# Patient Record
Sex: FEMALE | Race: WHITE | Employment: PART TIME | URBAN - METROPOLITAN AREA
[De-identification: names, ages, dates, MRNs, and addresses within clinical notes are randomized per-mention and may not be internally consistent; named-entity substitution may affect disease eponyms.]

---

## 2017-08-17 ENCOUNTER — PATIENT OUTREACH (OUTPATIENT)
Dept: FAMILY MEDICINE CLINIC | Facility: CLINIC | Age: 52
End: 2017-08-17

## 2017-08-17 NOTE — PROGRESS NOTES
Anupama Benavidez is due for mammogram.   Last mammogram date:  None in Epic  Mychart/Letter sent to patient.

## 2018-01-26 ENCOUNTER — PATIENT OUTREACH (OUTPATIENT)
Dept: FAMILY MEDICINE CLINIC | Facility: CLINIC | Age: 53
End: 2018-01-26

## 2019-02-04 ENCOUNTER — PATIENT OUTREACH (OUTPATIENT)
Dept: FAMILY MEDICINE CLINIC | Facility: CLINIC | Age: 54
End: 2019-02-04

## 2019-04-27 ENCOUNTER — OFFICE VISIT (OUTPATIENT)
Dept: FAMILY MEDICINE CLINIC | Facility: CLINIC | Age: 54
End: 2019-04-27
Payer: COMMERCIAL

## 2019-04-27 VITALS
TEMPERATURE: 99 F | BODY MASS INDEX: 27 KG/M2 | OXYGEN SATURATION: 98 % | WEIGHT: 166 LBS | SYSTOLIC BLOOD PRESSURE: 158 MMHG | HEART RATE: 114 BPM | DIASTOLIC BLOOD PRESSURE: 92 MMHG

## 2019-04-27 DIAGNOSIS — R09.81 NASAL CONGESTION: ICD-10-CM

## 2019-04-27 DIAGNOSIS — R05.8 PRODUCTIVE COUGH: Primary | ICD-10-CM

## 2019-04-27 DIAGNOSIS — R50.9 FEVER, UNSPECIFIED FEVER CAUSE: ICD-10-CM

## 2019-04-27 DIAGNOSIS — R03.0 ELEVATED BLOOD PRESSURE READING: ICD-10-CM

## 2019-04-27 PROCEDURE — 99203 OFFICE O/P NEW LOW 30 MIN: CPT | Performed by: FAMILY MEDICINE

## 2019-04-27 RX ORDER — AZITHROMYCIN 250 MG/1
TABLET, FILM COATED ORAL
Qty: 6 TABLET | Refills: 0 | Status: SHIPPED | OUTPATIENT
Start: 2019-04-27 | End: 2019-05-02

## 2019-04-27 NOTE — PROGRESS NOTES
Kerry  is a 47year old female. CC:  Patient presents with:  Sinus Problem: per pt      HPI:  The patient has primary complaint of nasal congestion and productive cough for  1 week. Associated symptoms include fever. The patient has 100.5-101. negative  PULM: faint coarseness in the L mid lung, no accessory muscle use  GI: not examined  PSYCH: alert and oriented x 3; affect appropriate  SKIN: not examined  BREAST: not examined/not applicable  EXTREMITIES: No clubbing, cyanosis or edema  RECTAL:

## 2019-05-01 ENCOUNTER — HOSPITAL ENCOUNTER (OUTPATIENT)
Dept: GENERAL RADIOLOGY | Age: 54
Discharge: HOME | End: 2019-05-01
Attending: FAMILY MEDICINE
Payer: COMMERCIAL

## 2019-05-01 DIAGNOSIS — R05.9 COUGH: ICD-10-CM

## 2019-05-01 DIAGNOSIS — R50.9 FEVER, UNSPECIFIED FEVER CAUSE: ICD-10-CM

## 2019-05-01 PROCEDURE — 71046 X-RAY EXAM CHEST 2 VIEWS: CPT | Performed by: FAMILY MEDICINE

## 2019-05-02 ENCOUNTER — TELEPHONE (OUTPATIENT)
Dept: FAMILY MEDICINE CLINIC | Facility: CLINIC | Age: 54
End: 2019-05-02

## 2019-05-02 NOTE — TELEPHONE ENCOUNTER
Pt called, she was wondering if we had the results of the chest xray she had done yesterday? If so, can we please call her with them?   Please call pt at [phone]

## 2019-05-02 NOTE — TELEPHONE ENCOUNTER
I told her at the [address] that it didn't show anything but I could hear something still in the lung base, she was going to call Friday AM and give me an update, has something changed?

## 2019-05-02 NOTE — TELEPHONE ENCOUNTER
Pt was advised of below information verbalized understanding    Pt states in the morning she has a lot of phlegm- she is trying to get as much out as possible. Pt denies fever.   Pt will keep us updated over the next couple days

## 2019-05-03 ENCOUNTER — TELEPHONE (OUTPATIENT)
Dept: FAMILY MEDICINE CLINIC | Facility: CLINIC | Age: 54
End: 2019-05-03

## 2019-05-03 NOTE — TELEPHONE ENCOUNTER
Patient is feeling better today and is coughing with some phlegm in the morning. But is doing better today.

## 2019-05-10 ENCOUNTER — TELEPHONE (OUTPATIENT)
Dept: FAMILY MEDICINE CLINIC | Facility: CLINIC | Age: 54
End: 2019-05-10

## 2019-05-10 RX ORDER — LEVOFLOXACIN 500 MG/1
500 TABLET, FILM COATED ORAL DAILY
Qty: 10 TABLET | Refills: 0 | Status: SHIPPED | OUTPATIENT
Start: 2019-05-10 | End: 2019-05-20

## 2019-05-10 NOTE — TELEPHONE ENCOUNTER
Patient notified and verbalized understanding. Requests script to [facility]  Pended script sent to pharmacy.

## 2019-05-10 NOTE — TELEPHONE ENCOUNTER
Zpac is gone and pt is coughing up lots of colored mucus. Can something else be sent to [facility]?  (Pt has allergies to some meds)

## 2019-05-10 NOTE — TELEPHONE ENCOUNTER
Patient states she is feeling better but is still coughing up a lot of heavy yellow phlem. States color used to be brown. States no fever. Is taking mucinex but is not getting any better. Doc Chow was given 4/27/19 at [address] with Dr Abdirashid Mccann.   It will be 2 wee

## 2020-01-22 ENCOUNTER — NURSE ONLY (OUTPATIENT)
Dept: FAMILY MEDICINE CLINIC | Facility: CLINIC | Age: 55
End: 2020-01-22
Payer: COMMERCIAL

## 2020-01-22 ENCOUNTER — TELEPHONE (OUTPATIENT)
Dept: FAMILY MEDICINE CLINIC | Facility: CLINIC | Age: 55
End: 2020-01-22

## 2020-01-22 DIAGNOSIS — R30.0 DYSURIA: Primary | ICD-10-CM

## 2020-01-22 LAB
MULTISTIX LOT#: NORMAL NUMERIC
PH, URINE: 6 (ref 4.5–8)
PROTEIN (URINE DIPSTICK): NEGATIVE MG/DL
SPECIFIC GRAVITY: <1.005 (ref 1–1.03)
UROBILINOGEN,SEMI-QN: 0.2 MG/DL (ref 0–1.9)

## 2020-01-22 PROCEDURE — 81003 URINALYSIS AUTO W/O SCOPE: CPT | Performed by: FAMILY MEDICINE

## 2020-01-22 PROCEDURE — 87077 CULTURE AEROBIC IDENTIFY: CPT | Performed by: FAMILY MEDICINE

## 2020-01-22 PROCEDURE — 87186 SC STD MICRODIL/AGAR DIL: CPT | Performed by: FAMILY MEDICINE

## 2020-01-22 PROCEDURE — 87086 URINE CULTURE/COLONY COUNT: CPT | Performed by: FAMILY MEDICINE

## 2020-01-22 RX ORDER — SULFAMETHOXAZOLE AND TRIMETHOPRIM 800; 160 MG/1; MG/1
1 TABLET ORAL 2 TIMES DAILY
Qty: 10 TABLET | Refills: 0 | Status: SHIPPED | OUTPATIENT
Start: 2020-01-22 | End: 2020-01-27

## 2020-01-22 NOTE — TELEPHONE ENCOUNTER
----- Message from Moiz Valencia DO sent at 1/22/2020 10:16 AM CST -----  Looks like she has a UTI, lets send it for culture, will start some bactrim DS for 5 days,

## 2020-01-22 NOTE — PROGRESS NOTES
Pt was in office for NV for urine labs - pt is having dysuria X1 day    Pt provided specimen- was sent home in stable condition.     Results sent to provider

## 2020-01-22 NOTE — TELEPHONE ENCOUNTER
Pt called, thinks she has a UTI. Can she just bring in a urine sample or does Dr Delfina Silva need to see her?   Please call pt at [phone]

## 2020-01-22 NOTE — TELEPHONE ENCOUNTER
Per verbal by Dr. Chris Guaman can do NV for urine dip    Pt advised- verbalized understanding    Future Appointments   Date Time Provider Pushpa Patel   1/22/2020 10:00 AM  [facility] [address] [facility] Celine Walls

## 2020-05-26 ENCOUNTER — OFFICE VISIT (OUTPATIENT)
Dept: FAMILY MEDICINE CLINIC | Facility: CLINIC | Age: 55
End: 2020-05-26
Payer: COMMERCIAL

## 2020-05-26 VITALS
HEIGHT: 66 IN | SYSTOLIC BLOOD PRESSURE: 130 MMHG | BODY MASS INDEX: 26.03 KG/M2 | DIASTOLIC BLOOD PRESSURE: 86 MMHG | TEMPERATURE: 99 F | HEART RATE: 120 BPM | WEIGHT: 162 LBS

## 2020-05-26 DIAGNOSIS — H69.81 ETD (EUSTACHIAN TUBE DYSFUNCTION), RIGHT: ICD-10-CM

## 2020-05-26 DIAGNOSIS — H92.02 OTALGIA, LEFT: ICD-10-CM

## 2020-05-26 DIAGNOSIS — R42 VERTIGO: Primary | ICD-10-CM

## 2020-05-26 PROCEDURE — 99213 OFFICE O/P EST LOW 20 MIN: CPT | Performed by: FAMILY MEDICINE

## 2020-05-26 NOTE — PROGRESS NOTES
HPI:   Janie Le is a 49year old female who presents for upper respiratory symptoms for  3  weeks. Patient reports sore throat, congestion, ear pain. Had pressure in her right ear and tried to pop because she couldn;t hear out of it.  And could not ge bruits  LUNGS: clear to auscultation  CARDIO: RRR without murmur  GI: good BS's,no masses, HSM or tenderness    ASSESSMENT AND PLAN:     Vertigo  (primary encounter diagnosis)  Otalgia, left  Etd (eustachian tube dysfunction), right    perform valsalva 10x

## 2020-05-29 ENCOUNTER — TELEPHONE (OUTPATIENT)
Dept: FAMILY MEDICINE CLINIC | Facility: CLINIC | Age: 55
End: 2020-05-29

## 2020-05-29 RX ORDER — METHYLPREDNISOLONE 4 MG/1
TABLET ORAL
Qty: 1 KIT | Refills: 0 | Status: SHIPPED | OUTPATIENT
Start: 2020-05-29 | End: 2020-09-22 | Stop reason: ALTCHOICE

## 2020-05-29 NOTE — TELEPHONE ENCOUNTER
Pt called she is still having some vertigo issues but she functioning and able to do her house work. Yesterday she felt better than today. She is also unable to pop right ear but left she able to pop. She doing everything that is recommended.

## 2020-05-29 NOTE — TELEPHONE ENCOUNTER
PT CALLED AND WANTED TO KNOW IF SHE SHOULD STILL CONTINUE TO TAKE FLONASE AND SUDAFED WHILE TAKING STEROID?     PLEASE CALL AND ADV    THANK YOU

## 2020-07-01 ENCOUNTER — OFFICE VISIT (OUTPATIENT)
Dept: FAMILY MEDICINE CLINIC | Facility: CLINIC | Age: 55
End: 2020-07-01
Payer: COMMERCIAL

## 2020-07-01 VITALS
SYSTOLIC BLOOD PRESSURE: 132 MMHG | RESPIRATION RATE: 16 BRPM | WEIGHT: 164.19 LBS | OXYGEN SATURATION: 99 % | TEMPERATURE: 100 F | BODY MASS INDEX: 27 KG/M2 | DIASTOLIC BLOOD PRESSURE: 86 MMHG | HEART RATE: 104 BPM

## 2020-07-01 DIAGNOSIS — R11.0 NAUSEA: ICD-10-CM

## 2020-07-01 DIAGNOSIS — J30.89 SEASONAL ALLERGIC RHINITIS DUE TO OTHER ALLERGIC TRIGGER: ICD-10-CM

## 2020-07-01 DIAGNOSIS — R42 VERTIGO: Primary | ICD-10-CM

## 2020-07-01 PROCEDURE — 99214 OFFICE O/P EST MOD 30 MIN: CPT | Performed by: FAMILY MEDICINE

## 2020-07-01 RX ORDER — MECLIZINE HYDROCHLORIDE 25 MG/1
25 TABLET ORAL 2 TIMES DAILY
Qty: 14 TABLET | Refills: 0 | Status: SHIPPED | OUTPATIENT
Start: 2020-07-01 | End: 2020-07-08

## 2020-07-01 NOTE — PROGRESS NOTES
HPI:   Kerry Machado is a 49year old female who presents for follow up after an upper respiratory symptoms for  3  weeks. Patient reported had, she was placed  pressure in her right ear and tried to pop because she couldn;t hear out of it.  And could not 132/86   Pulse 104   Temp 99.6 °F (37.6 °C) (Temporal)   Resp 16   Wt 164 lb 3.2 oz (74.5 kg)   LMP 06/15/2020   SpO2 99%   BMI 26.50 kg/m²   GENERAL: well developed, well nourished,in no apparent distress  SKIN: no rashes,no suspicious lesions  EYES:PERRL

## 2020-09-12 ENCOUNTER — TELEPHONE (OUTPATIENT)
Dept: FAMILY MEDICINE CLINIC | Facility: CLINIC | Age: 55
End: 2020-09-12

## 2020-09-12 DIAGNOSIS — N30.01 ACUTE CYSTITIS WITH HEMATURIA: Primary | ICD-10-CM

## 2020-09-12 RX ORDER — SULFAMETHOXAZOLE AND TRIMETHOPRIM 800; 160 MG/1; MG/1
1 TABLET ORAL 2 TIMES DAILY
Qty: 10 TABLET | Refills: 0 | Status: SHIPPED | OUTPATIENT
Start: 2020-09-12 | End: 2020-09-17

## 2020-09-12 NOTE — TELEPHONE ENCOUNTER
ON call    Patient reports increase frequency, urgency burning and low back pain. She ahs uti in past. [place] ok for charge to insurance for telephone encounter. Time spent 6 mins. .  Antibiotic send. F/u with pcp. If worsen go to ER.

## 2020-09-22 PROCEDURE — 88305 TISSUE EXAM BY PATHOLOGIST: CPT | Performed by: OBSTETRICS & GYNECOLOGY

## 2021-04-06 ENCOUNTER — TELEPHONE (OUTPATIENT)
Dept: FAMILY MEDICINE CLINIC | Facility: CLINIC | Age: 56
End: 2021-04-06

## 2022-06-07 ENCOUNTER — LABORATORY ENCOUNTER (OUTPATIENT)
Dept: LAB | Age: 57
End: 2022-06-07
Attending: FAMILY MEDICINE
Payer: COMMERCIAL

## 2022-06-07 ENCOUNTER — TELEPHONE (OUTPATIENT)
Dept: FAMILY MEDICINE CLINIC | Facility: CLINIC | Age: 57
End: 2022-06-07

## 2022-06-07 DIAGNOSIS — Z00.00 ROUTINE HEALTH MAINTENANCE: Primary | ICD-10-CM

## 2022-06-07 DIAGNOSIS — D50.0 IRON DEFICIENCY ANEMIA DUE TO CHRONIC BLOOD LOSS: Primary | ICD-10-CM

## 2022-06-07 DIAGNOSIS — Z00.00 ROUTINE HEALTH MAINTENANCE: ICD-10-CM

## 2022-06-07 DIAGNOSIS — D50.0 IRON DEFICIENCY ANEMIA DUE TO CHRONIC BLOOD LOSS: ICD-10-CM

## 2022-06-07 LAB
ALBUMIN SERPL-MCNC: 3.9 G/DL (ref 3.4–5)
ALBUMIN/GLOB SERPL: 1.1 {RATIO} (ref 1–2)
ALP LIVER SERPL-CCNC: 60 U/L
ALT SERPL-CCNC: 21 U/L
ANION GAP SERPL CALC-SCNC: 4 MMOL/L (ref 0–18)
AST SERPL-CCNC: 20 U/L (ref 15–37)
BASOPHILS # BLD AUTO: 0.06 X10(3) UL (ref 0–0.2)
BASOPHILS NFR BLD AUTO: 1.3 %
BILIRUB SERPL-MCNC: 0.7 MG/DL (ref 0.1–2)
BUN BLD-MCNC: 14 MG/DL (ref 7–18)
CALCIUM BLD-MCNC: 9 MG/DL (ref 8.5–10.1)
CHLORIDE SERPL-SCNC: 105 MMOL/L (ref 98–112)
CHOLEST SERPL-MCNC: 172 MG/DL (ref ?–200)
CO2 SERPL-SCNC: 28 MMOL/L (ref 21–32)
CREAT BLD-MCNC: 0.94 MG/DL
DEPRECATED HBV CORE AB SER IA-ACNC: 5.8 NG/ML
EOSINOPHIL # BLD AUTO: 0.32 X10(3) UL (ref 0–0.7)
EOSINOPHIL NFR BLD AUTO: 7 %
ERYTHROCYTE [DISTWIDTH] IN BLOOD BY AUTOMATED COUNT: 14.6 %
FASTING PATIENT LIPID ANSWER: YES
FASTING STATUS PATIENT QL REPORTED: YES
GLOBULIN PLAS-MCNC: 3.4 G/DL (ref 2.8–4.4)
GLUCOSE BLD-MCNC: 86 MG/DL (ref 70–99)
HCT VFR BLD AUTO: 37.2 %
HDLC SERPL-MCNC: 70 MG/DL (ref 40–59)
HGB BLD-MCNC: 11.8 G/DL
IMM GRANULOCYTES # BLD AUTO: 0.01 X10(3) UL (ref 0–1)
IMM GRANULOCYTES NFR BLD: 0.2 %
IRON SERPL-MCNC: 64 UG/DL
LDLC SERPL CALC-MCNC: 92 MG/DL (ref ?–100)
LYMPHOCYTES # BLD AUTO: 1.27 X10(3) UL (ref 1–4)
LYMPHOCYTES NFR BLD AUTO: 27.9 %
MCH RBC QN AUTO: 27.3 PG (ref 26–34)
MCHC RBC AUTO-ENTMCNC: 31.7 G/DL (ref 31–37)
MCV RBC AUTO: 86.1 FL
MONOCYTES # BLD AUTO: 0.4 X10(3) UL (ref 0.1–1)
MONOCYTES NFR BLD AUTO: 8.8 %
NEUTROPHILS # BLD AUTO: 2.49 X10 (3) UL (ref 1.5–7.7)
NEUTROPHILS # BLD AUTO: 2.49 X10(3) UL (ref 1.5–7.7)
NEUTROPHILS NFR BLD AUTO: 54.8 %
NONHDLC SERPL-MCNC: 102 MG/DL (ref ?–130)
OSMOLALITY SERPL CALC.SUM OF ELEC: 284 MOSM/KG (ref 275–295)
PLATELET # BLD AUTO: 217 10(3)UL (ref 150–450)
POTASSIUM SERPL-SCNC: 4 MMOL/L (ref 3.5–5.1)
PROT SERPL-MCNC: 7.3 G/DL (ref 6.4–8.2)
RBC # BLD AUTO: 4.32 X10(6)UL
SODIUM SERPL-SCNC: 137 MMOL/L (ref 136–145)
TRIGL SERPL-MCNC: 51 MG/DL (ref 30–149)
TSI SER-ACNC: 0.97 MIU/ML (ref 0.36–3.74)
VLDLC SERPL CALC-MCNC: 8 MG/DL (ref 0–30)
WBC # BLD AUTO: 4.6 X10(3) UL (ref 4–11)

## 2022-06-07 PROCEDURE — 80050 GENERAL HEALTH PANEL: CPT | Performed by: FAMILY MEDICINE

## 2022-06-07 PROCEDURE — 82728 ASSAY OF FERRITIN: CPT | Performed by: FAMILY MEDICINE

## 2022-06-07 PROCEDURE — 80061 LIPID PANEL: CPT | Performed by: FAMILY MEDICINE

## 2022-06-07 PROCEDURE — 83540 ASSAY OF IRON: CPT | Performed by: FAMILY MEDICINE

## 2022-06-07 NOTE — TELEPHONE ENCOUNTER
Labs are placed. Yvette Contreras can make a lab appointment. I have agreed to see her as a patient.

## 2022-06-07 NOTE — TELEPHONE ENCOUNTER
Patient advised and lab appt scheduled for today.   Future Appointments   Date Time Provider Pushpa Patel   6/7/2022  1:00 PM REF [facility] REF [facility] Ref Lab [facility]

## 2022-06-08 DIAGNOSIS — L29.9 ITCHY SKIN: Primary | ICD-10-CM

## 2022-06-09 NOTE — PROGRESS NOTES
Libertad Narayan you will need to return to get the allergy labs done. They can not be added to the blood that was drawn. Orders placed.

## 2022-06-13 ENCOUNTER — LABORATORY ENCOUNTER (OUTPATIENT)
Dept: LAB | Age: 57
End: 2022-06-13
Attending: FAMILY MEDICINE
Payer: COMMERCIAL

## 2022-06-13 DIAGNOSIS — L29.9 ITCHY SKIN: ICD-10-CM

## 2022-06-13 PROCEDURE — 82785 ASSAY OF IGE: CPT | Performed by: FAMILY MEDICINE

## 2022-06-13 PROCEDURE — 86003 ALLG SPEC IGE CRUDE XTRC EA: CPT | Performed by: FAMILY MEDICINE

## 2022-06-16 LAB
A ALTERNATA IGE QN: <0.1 KUA/L (ref ?–0.1)
A FUMIGATUS IGE QN: <0.1 KUA/L (ref ?–0.1)
AMER SYCAMORE IGE QN: <0.1 KUA/L (ref ?–0.1)
BERMUDA GRASS IGE QN: <0.1 KUA/L (ref ?–0.1)
BOXELDER IGE QN: <0.1 KUA/L (ref ?–0.1)
C HERBARUM IGE QN: <0.1 KUA/L (ref ?–0.1)
CALIF WALNUT IGE QN: <0.1 KUA/L (ref ?–0.1)
CAT DANDER IGE QN: <0.1 KUA/L (ref ?–0.1)
CLAM IGE QN: <0.1 KUA/L (ref ?–0.1)
CMN PIGWEED IGE QN: <0.1 KUA/L (ref ?–0.1)
CODFISH IGE QN: <0.1 KUA/L (ref ?–0.1)
COMMON RAGWEED IGE QN: <0.1 KUA/L (ref ?–0.1)
CORN IGE QN: <0.1 KUA/L (ref ?–0.1)
COTTONWOOD IGE QN: <0.1 KUA/L (ref ?–0.1)
COW MILK IGE QN: <0.1 KUA/L (ref ?–0.1)
D FARINAE IGE QN: <0.1 KUA/L (ref ?–0.1)
D PTERONYSS IGE QN: <0.1 KUA/L (ref ?–0.1)
DOG DANDER IGE QN: <0.1 KUA/L (ref ?–0.1)
EGG WHITE IGE QN: <0.1 KUA/L (ref ?–0.1)
IGE SERPL-ACNC: 7.59 KU/L (ref 2–214)
IGE SERPL-ACNC: 8.8 KU/L (ref 2–214)
M RACEMOSUS IGE QN: <0.1 KUA/L (ref ?–0.1)
MARSH ELDER IGE QN: <0.1 KUA/L (ref ?–0.1)
MOUSE EPITH IGE QN: <0.1 KUA/L (ref ?–0.1)
MT JUNIPER IGE QN: <0.1 KUA/L (ref ?–0.1)
P NOTATUM IGE QN: <0.1 KUA/L (ref ?–0.1)
PEANUT IGE QN: <0.1 KUA/L (ref ?–0.1)
PECAN/HICK TREE IGE QN: <0.1 KUA/L (ref ?–0.1)
ROACH IGE QN: <0.1 KUA/L (ref ?–0.1)
SALTWORT IGE QN: <0.1 KUA/L (ref ?–0.1)
SCALLOP IGE QN: <0.1 KUA/L (ref ?–0.1)
SESAME SEED IGE QN: <0.1 KUA/L (ref ?–0.1)
SHRIMP IGE QN: <0.1 KUA/L (ref ?–0.1)
SOYBEAN IGE QN: <0.1 KUA/L (ref ?–0.1)
TIMOTHY IGE QN: <0.1 KUA/L (ref ?–0.1)
WALNUT IGE QN: <0.1 KUA/L (ref ?–0.1)
WHEAT IGE QN: <0.1 KUA/L (ref ?–0.1)
WHITE ASH IGE QN: <0.1 KUA/L (ref ?–0.1)
WHITE ELM IGE QN: <0.1 KUA/L (ref ?–0.1)
WHITE MULBERRY IGE QN: <0.1 KUA/L (ref ?–0.1)
WHITE OAK IGE QN: <0.1 KUA/L (ref ?–0.1)

## 2022-07-08 ENCOUNTER — OFFICE VISIT (OUTPATIENT)
Dept: FAMILY MEDICINE CLINIC | Facility: CLINIC | Age: 57
End: 2022-07-08
Payer: COMMERCIAL

## 2022-07-08 VITALS
SYSTOLIC BLOOD PRESSURE: 120 MMHG | RESPIRATION RATE: 18 BRPM | TEMPERATURE: 98 F | DIASTOLIC BLOOD PRESSURE: 60 MMHG | WEIGHT: 164 LBS | OXYGEN SATURATION: 100 % | HEART RATE: 95 BPM | HEIGHT: 66 IN | BODY MASS INDEX: 26.36 KG/M2

## 2022-07-08 DIAGNOSIS — L29.8 CHRONIC PRURITIC RASH IN ADULT: Primary | ICD-10-CM

## 2022-07-08 DIAGNOSIS — D50.0 IRON DEFICIENCY ANEMIA DUE TO CHRONIC BLOOD LOSS: ICD-10-CM

## 2022-07-08 DIAGNOSIS — L50.3 DERMATOGRAPHIC URTICARIA: ICD-10-CM

## 2022-07-08 PROCEDURE — 3078F DIAST BP <80 MM HG: CPT | Performed by: FAMILY MEDICINE

## 2022-07-08 PROCEDURE — 99213 OFFICE O/P EST LOW 20 MIN: CPT | Performed by: FAMILY MEDICINE

## 2022-07-08 PROCEDURE — 3074F SYST BP LT 130 MM HG: CPT | Performed by: FAMILY MEDICINE

## 2022-07-08 PROCEDURE — 3008F BODY MASS INDEX DOCD: CPT | Performed by: FAMILY MEDICINE

## 2022-07-08 RX ORDER — FEXOFENADINE HYDROCHLORIDE 60 MG/1
60 TABLET, FILM COATED ORAL DAILY
COMMUNITY

## 2022-07-08 RX ORDER — PREDNISONE 20 MG/1
20 TABLET ORAL 2 TIMES DAILY
Qty: 10 TABLET | Refills: 0 | Status: SHIPPED | OUTPATIENT
Start: 2022-07-08 | End: 2022-07-13

## 2022-07-08 RX ORDER — HYDROCORTISONE 25 MG/ML
1 LOTION TOPICAL 2 TIMES DAILY
Qty: 59 ML | Refills: 0 | Status: SHIPPED | OUTPATIENT
Start: 2022-07-08

## 2022-07-12 ENCOUNTER — MED REC SCAN ONLY (OUTPATIENT)
Dept: FAMILY MEDICINE CLINIC | Facility: CLINIC | Age: 57
End: 2022-07-12

## 2022-08-23 ENCOUNTER — TELEPHONE (OUTPATIENT)
Dept: FAMILY MEDICINE CLINIC | Facility: CLINIC | Age: 57
End: 2022-08-23

## 2023-02-22 ENCOUNTER — TELEPHONE (OUTPATIENT)
Dept: FAMILY MEDICINE CLINIC | Facility: CLINIC | Age: 58
End: 2023-02-22

## 2023-02-22 DIAGNOSIS — D50.0 IRON DEFICIENCY ANEMIA DUE TO CHRONIC BLOOD LOSS: Primary | ICD-10-CM

## 2023-02-22 NOTE — TELEPHONE ENCOUNTER
LOV with Dr. Tatiana Jacome 7/8/22, was due for 3 month MD f/u with CBC and ferritin. Pt called and scheduled lab appt for next week for CBC, ferritin.      ANU DS, orders placed, after pt has labs, will have pt f/u with MD.

## 2023-02-22 NOTE — TELEPHONE ENCOUNTER
PT. CALLED AND SCHEDULED LAB APPT. FOR CBC AND FERRITAN FOR NEXT TUES. NEED ORDERS PLACED AND PLEASE CALL PT. IF EITHER OF THESE LABS ARE FASTING.

## 2023-02-28 ENCOUNTER — LABORATORY ENCOUNTER (OUTPATIENT)
Dept: LAB | Age: 58
End: 2023-02-28
Attending: FAMILY MEDICINE
Payer: COMMERCIAL

## 2023-02-28 DIAGNOSIS — D50.0 IRON DEFICIENCY ANEMIA DUE TO CHRONIC BLOOD LOSS: ICD-10-CM

## 2023-02-28 LAB
BASOPHILS # BLD AUTO: 0.05 X10(3) UL (ref 0–0.2)
BASOPHILS NFR BLD AUTO: 1 %
DEPRECATED HBV CORE AB SER IA-ACNC: 44.2 NG/ML
EOSINOPHIL # BLD AUTO: 0.25 X10(3) UL (ref 0–0.7)
EOSINOPHIL NFR BLD AUTO: 5.2 %
ERYTHROCYTE [DISTWIDTH] IN BLOOD BY AUTOMATED COUNT: 12.4 %
HCT VFR BLD AUTO: 44.8 %
HGB BLD-MCNC: 14.7 G/DL
IMM GRANULOCYTES # BLD AUTO: 0.02 X10(3) UL (ref 0–1)
IMM GRANULOCYTES NFR BLD: 0.4 %
LYMPHOCYTES # BLD AUTO: 1.15 X10(3) UL (ref 1–4)
LYMPHOCYTES NFR BLD AUTO: 24 %
MCH RBC QN AUTO: 30.9 PG (ref 26–34)
MCHC RBC AUTO-ENTMCNC: 32.8 G/DL (ref 31–37)
MCV RBC AUTO: 94.3 FL
MONOCYTES # BLD AUTO: 0.36 X10(3) UL (ref 0.1–1)
MONOCYTES NFR BLD AUTO: 7.5 %
NEUTROPHILS # BLD AUTO: 2.96 X10 (3) UL (ref 1.5–7.7)
NEUTROPHILS # BLD AUTO: 2.96 X10(3) UL (ref 1.5–7.7)
NEUTROPHILS NFR BLD AUTO: 61.9 %
PLATELET # BLD AUTO: 202 10(3)UL (ref 150–450)
RBC # BLD AUTO: 4.75 X10(6)UL
WBC # BLD AUTO: 4.8 X10(3) UL (ref 4–11)

## 2023-02-28 PROCEDURE — 82728 ASSAY OF FERRITIN: CPT | Performed by: FAMILY MEDICINE

## 2023-02-28 PROCEDURE — 85025 COMPLETE CBC W/AUTO DIFF WBC: CPT | Performed by: FAMILY MEDICINE

## 2023-04-12 ENCOUNTER — TELEPHONE (OUTPATIENT)
Dept: FAMILY MEDICINE CLINIC | Facility: CLINIC | Age: 58
End: 2023-04-12

## 2023-04-12 NOTE — TELEPHONE ENCOUNTER
Calling pt-she is due for a complete physical and fasting lab work. She did schedule.       Future Appointments   Date Time Provider Pushpa Amanda   5/24/2023  5:00 PM [facility]

## 2023-09-28 ENCOUNTER — PATIENT OUTREACH (OUTPATIENT)
Dept: FAMILY MEDICINE CLINIC | Facility: CLINIC | Age: 58
End: 2023-09-28

## 2024-04-29 ENCOUNTER — OFFICE VISIT (OUTPATIENT)
Dept: FAMILY MEDICINE CLINIC | Facility: CLINIC | Age: 59
End: 2024-04-29
Payer: COMMERCIAL

## 2024-04-29 VITALS
HEART RATE: 115 BPM | SYSTOLIC BLOOD PRESSURE: 132 MMHG | HEIGHT: 66 IN | DIASTOLIC BLOOD PRESSURE: 82 MMHG | WEIGHT: 172.13 LBS | BODY MASS INDEX: 27.66 KG/M2 | TEMPERATURE: 99 F | OXYGEN SATURATION: 98 %

## 2024-04-29 DIAGNOSIS — Z12.31 BREAST CANCER SCREENING BY MAMMOGRAM: ICD-10-CM

## 2024-04-29 DIAGNOSIS — J02.0 STREP SORE THROAT: Primary | ICD-10-CM

## 2024-04-29 DIAGNOSIS — J02.9 SORE THROAT: ICD-10-CM

## 2024-04-29 DIAGNOSIS — Z12.11 COLON CANCER SCREENING: ICD-10-CM

## 2024-04-29 LAB
CONTROL LINE PRESENT WITH A CLEAR BACKGROUND (YES/NO): YES YES/NO
KIT LOT #: ABNORMAL NUMERIC
STREP GRP A CUL-SCR: POSITIVE

## 2024-04-29 RX ORDER — AZITHROMYCIN 500 MG/1
500 TABLET, FILM COATED ORAL DAILY
Qty: 5 TABLET | Refills: 0 | Status: SHIPPED | OUTPATIENT
Start: 2024-04-29

## 2024-04-29 NOTE — PROGRESS NOTES
HPI:   Nikole Hargrove is a 58 year old female who presents for upper respiratory symptoms for  6  days. Patient reports sore throat. Baby sits 2 kids. Exposed to strep.  No fever. Using lozenges. Sore throat is keeping her up. Started to have itchy skin and back on allegra which is helpng    Current Outpatient Medications   Medication Sig Dispense Refill    azithromycin 500 MG Oral Tab Take 1 tablet (500 mg total) by mouth daily. 5 tablet 0    fexofenadine 60 MG Oral Tab Take 1 tablet (60 mg total) by mouth daily.      Hydrocortisone 2.5 % External Lotion Apply 1 Application topically 2 (two) times daily. 59 mL 0      Past Medical History:    Calculus of gallbladder without mention of cholecystitis or obstruction    Shortness of breath    occured after she inhaled fumes from bleach, it also caused a burning sensation      Past Surgical History:   Procedure Laterality Date    Laparoscopic cholecystectomy N/A 2015    Procedure: LAPAROSCOPIC CHOLECYSTECTOMY;  Surgeon: Adams Chaney DO;  Location:  MAIN OR          x's 5      Family History   Problem Relation Age of Onset    Diabetes Mother       Social History     Socioeconomic History    Marital status:    Tobacco Use    Smoking status: Never    Smokeless tobacco: Never   Vaping Use    Vaping status: Never Used   Substance and Sexual Activity    Alcohol use: Not Currently     Comment: rare    Drug use: No    Sexual activity: Yes     Partners: Male   Other Topics Concern    Seat Belt Yes     Comment: 100%         REVIEW OF SYSTEMS:   GENERAL: feels well otherwise  SKIN: no rashes  EYES:denies dischrage  HEENT: not congested  LUNGS: denies cough  CARDIOVASCULAR: deniestachycardia  GI: no nausea or abdominal pain  NEURO: dull headaches    EXAM:   /82   Pulse 115   Temp 98.9 °F (37.2 °C) (Tympanic)   Ht 5' 6\" (1.676 m)   Wt 172 lb 2 oz (78.1 kg)   SpO2 98%   BMI 27.78 kg/m²   GENERAL: well developed, well nourished,in no apparent  distress  SKIN: no rashes,no suspicious lesions  EYES:P conjunctiva are clear  HEENT: nares - clear ,ears - normal, throat is hyperemic  NECK: supple,no adenopathy,no bruits  LUNGS: clear to auscultation  CARDIO: RRR without murmur  GI: good BS's,no masses, HSM or tenderness    ASSESSMENT AND PLAN:   Nikole Hargrove is a 58 year old female who presents with     1. Strep sore throat  - rapid _+  = gargle , lozenges, motrin  - azithromycin 500 MG Oral Tab; Take 1 tablet (500 mg total) by mouth daily.  Dispense: 5 tablet; Refill: 0    2. Sore throat  - Rapid Strep +    3. Colon cancer screening  - COLOGUARD COLON CANCER SCREENING (EXTERNAL)    4. Breast cancer screening by mammogram  - E monthly  - [facility] MIRTA 2D+3D SCREENING BILAT (CPT=77067/19944); Future    The patient indicates understanding of these issues and agrees to the plan.  The patient is asked to return if sx's persist or worsen.

## 2025-01-15 ENCOUNTER — OFFICE VISIT (OUTPATIENT)
Dept: FAMILY MEDICINE CLINIC | Facility: CLINIC | Age: 60
End: 2025-01-15
Payer: COMMERCIAL

## 2025-01-15 VITALS
RESPIRATION RATE: 16 BRPM | OXYGEN SATURATION: 97 % | BODY MASS INDEX: 28.03 KG/M2 | SYSTOLIC BLOOD PRESSURE: 124 MMHG | HEIGHT: 66 IN | TEMPERATURE: 99 F | DIASTOLIC BLOOD PRESSURE: 66 MMHG | HEART RATE: 116 BPM | WEIGHT: 174.38 LBS

## 2025-01-15 DIAGNOSIS — J18.9 WALKING PNEUMONIA: Primary | ICD-10-CM

## 2025-01-15 DIAGNOSIS — J10.1 INFLUENZA A: ICD-10-CM

## 2025-01-15 PROCEDURE — 99213 OFFICE O/P EST LOW 20 MIN: CPT | Performed by: FAMILY MEDICINE

## 2025-01-15 PROCEDURE — 3008F BODY MASS INDEX DOCD: CPT | Performed by: FAMILY MEDICINE

## 2025-01-15 PROCEDURE — 3074F SYST BP LT 130 MM HG: CPT | Performed by: FAMILY MEDICINE

## 2025-01-15 PROCEDURE — 3078F DIAST BP <80 MM HG: CPT | Performed by: FAMILY MEDICINE

## 2025-01-15 RX ORDER — AZITHROMYCIN 250 MG/1
TABLET, FILM COATED ORAL
Qty: 6 TABLET | Refills: 0 | Status: SHIPPED | OUTPATIENT
Start: 2025-01-15 | End: 2025-01-20

## 2025-01-15 NOTE — PROGRESS NOTES
HPI:   Nikole Hargrove is a 59 year old female who presents for upper respiratory symptoms for  5  days. Patient reports congestion, fever with Tmax to 102, cough is not keeping pt up at night. Daughter sick with same  and son started to have fever as well. No diarrhea.  Fever for 5 days.  Using motrin for fever.     Current Outpatient Medications   Medication Sig Dispense Refill    azithromycin (ZITHROMAX Z-LEX) 250 MG Oral Tab Take 2 tablets (500 mg total) by mouth daily for 1 day, THEN 1 tablet (250 mg total) daily for 4 days. 6 tablet 0    Hydrocortisone 2.5 % External Lotion Apply 1 Application topically 2 (two) times daily. (Patient not taking: Reported on 1/15/2025) 59 mL 0      Past Medical History:    Calculus of gallbladder without mention of cholecystitis or obstruction    Shortness of breath    occured after she inhaled fumes from bleach, it also caused a burning sensation      Past Surgical History:   Procedure Laterality Date    Laparoscopic cholecystectomy N/A 2015    Procedure: LAPAROSCOPIC CHOLECYSTECTOMY;  Surgeon: Adams Chaney DO;  Location:  MAIN OR          x's 5      Family History   Problem Relation Age of Onset    Diabetes Mother       Social History     Socioeconomic History    Marital status:    Tobacco Use    Smoking status: Never    Smokeless tobacco: Never   Vaping Use    Vaping status: Never Used   Substance and Sexual Activity    Alcohol use: Not Currently     Comment: rare    Drug use: No    Sexual activity: Yes     Partners: Male   Other Topics Concern    Seat Belt Yes     Comment: 100%         REVIEW OF SYSTEMS:   GENERAL: feels exhausted  SKIN: no rashes  EYES:denies blurred vision or double vision  HEENT: congested  LUNGS: productive cough  CARDIOVASCULAR: denies chest pain on exertion  GI: no nausea or abdominal pain  NEURO: denies headaches    EXAM:   /66 (BP Location: Left arm, Patient Position: Sitting, Cuff Size: adult)   Pulse 116   Temp 99.3 °F  (37.4 °C) (Temporal)   Resp 16   Ht 5' 6\" (1.676 m)   Wt 174 lb 6.4 oz (79.1 kg)   SpO2 97%   BMI 28.15 kg/m²   GENERAL: well developed, well nourished,in no apparent distress  SKIN: no rashes,no suspicious lesions  EYES: conjunctiva are clear  HEENT: nares- clear, ears and throat are clear  NECK: supple,no adenopathy  LUNGS: rhonchi bilateral. - no wheeze  CARDIO: RRR without murmur  GI: good BS's,no masses, HSM or tenderness    ASSESSMENT AND PLAN:   Nikole Hargrove is a 59 year old female who presents with     1. Walking pneumonia  - mucinex DM bid  - zpack  - fluids    2. Influenza A  - supportive care       The patient indicates understanding of these issues and agrees to the plan.  The patient is asked to return if sx's persist or worsen.

## (undated) NOTE — LETTER
08/17/17      [address] [facility]        Dear Alise Maya      To help us provide the highest quality medical care, [facility] uses a sophisticated computer system to track our patient records.  During a review of

## (undated) NOTE — LETTER
[address] [facility], [address] [address] [phone]                [unfilled]          [address] [facility]ia [facility]

## (undated) NOTE — LETTER
[address] [facility] [phone]                  1/26/2018        [address]        Dear Patient,     According to our records

## (undated) NOTE — LETTER
[address] [facility], [address] [place] [address]. [address]  [phone]                  2/4/19        [address]          Dear Patient,     According to our laura

## (undated) NOTE — LETTER
[address] [place] [facility] [[email or web address]., [address] [address]  [phone]          8/23/2022        Jamaal Orta  [address] [place] [address]      Dear Jamaal Orta. According to our records you are due for your annual visit with Dr. Joselito Webster. Regular visits are very important to your health. If you are seeing a different primary care physician, please let us know so that we may update our records accordingly. Please contact our office at [phone] to schedule an appointment with Dr. Joselito Webster. Thank you.          Pushpa Manrique